# Patient Record
Sex: FEMALE | Race: WHITE
[De-identification: names, ages, dates, MRNs, and addresses within clinical notes are randomized per-mention and may not be internally consistent; named-entity substitution may affect disease eponyms.]

---

## 2018-05-08 ENCOUNTER — HOSPITAL ENCOUNTER (OUTPATIENT)
Dept: HOSPITAL 95 - LAB SHORT | Age: 63
Discharge: HOME | End: 2018-05-08
Attending: DERMATOLOGY
Payer: COMMERCIAL

## 2018-05-08 DIAGNOSIS — D04.61: Primary | ICD-10-CM

## 2019-07-08 ENCOUNTER — HOSPITAL ENCOUNTER (OUTPATIENT)
Dept: HOSPITAL 95 - LAB | Age: 64
Discharge: HOME | End: 2019-07-08
Attending: NURSE PRACTITIONER
Payer: COMMERCIAL

## 2019-07-08 DIAGNOSIS — Z01.419: Primary | ICD-10-CM

## 2019-07-08 PROCEDURE — G0145 SCR C/V CYTO,THINLAYER,RESCR: HCPCS

## 2019-07-10 LAB — OTHER STN SPEC: (no result)

## 2019-07-27 ENCOUNTER — HOSPITAL ENCOUNTER (OUTPATIENT)
Dept: HOSPITAL 95 - LAB SHORT | Age: 64
Discharge: HOME | End: 2019-07-27
Attending: PHYSICIAN ASSISTANT
Payer: COMMERCIAL

## 2019-07-27 DIAGNOSIS — N39.0: Primary | ICD-10-CM

## 2020-10-28 ENCOUNTER — HOSPITAL ENCOUNTER (OUTPATIENT)
Dept: HOSPITAL 95 - LAB EV | Age: 65
Discharge: HOME | End: 2020-10-28
Attending: NURSE PRACTITIONER
Payer: COMMERCIAL

## 2020-10-28 DIAGNOSIS — Z20.828: Primary | ICD-10-CM

## 2020-10-28 PROCEDURE — U0003 INFECTIOUS AGENT DETECTION BY NUCLEIC ACID (DNA OR RNA); SEVERE ACUTE RESPIRATORY SYNDROME CORONAVIRUS 2 (SARS-COV-2) (CORONAVIRUS DISEASE [COVID-19]), AMPLIFIED PROBE TECHNIQUE, MAKING USE OF HIGH THROUGHPUT TECHNOLOGIES AS DESCRIBED BY CMS-2020-01-R: HCPCS

## 2020-11-13 ENCOUNTER — HOSPITAL ENCOUNTER (OUTPATIENT)
Dept: HOSPITAL 95 - LAB SHORT | Age: 65
Discharge: HOME | End: 2020-11-13
Attending: FAMILY MEDICINE
Payer: COMMERCIAL

## 2020-11-13 DIAGNOSIS — Z20.828: Primary | ICD-10-CM

## 2020-11-13 PROCEDURE — U0003 INFECTIOUS AGENT DETECTION BY NUCLEIC ACID (DNA OR RNA); SEVERE ACUTE RESPIRATORY SYNDROME CORONAVIRUS 2 (SARS-COV-2) (CORONAVIRUS DISEASE [COVID-19]), AMPLIFIED PROBE TECHNIQUE, MAKING USE OF HIGH THROUGHPUT TECHNOLOGIES AS DESCRIBED BY CMS-2020-01-R: HCPCS

## 2021-07-07 ENCOUNTER — HOSPITAL ENCOUNTER (OUTPATIENT)
Dept: HOSPITAL 95 - LAB SHORT | Age: 66
Discharge: HOME | End: 2021-07-07
Attending: DERMATOLOGY
Payer: COMMERCIAL

## 2021-07-07 DIAGNOSIS — D22.0: Primary | ICD-10-CM

## 2022-03-03 ENCOUNTER — HOSPITAL ENCOUNTER (OUTPATIENT)
Dept: HOSPITAL 95 - ORSCMMR | Age: 67
Discharge: HOME | End: 2022-03-03
Attending: STUDENT IN AN ORGANIZED HEALTH CARE EDUCATION/TRAINING PROGRAM
Payer: COMMERCIAL

## 2022-03-03 VITALS — BODY MASS INDEX: 23.35 KG/M2 | HEIGHT: 66 IN | WEIGHT: 145.28 LBS

## 2022-03-03 DIAGNOSIS — K63.5: ICD-10-CM

## 2022-03-03 DIAGNOSIS — K21.9: ICD-10-CM

## 2022-03-03 DIAGNOSIS — Z79.82: ICD-10-CM

## 2022-03-03 DIAGNOSIS — L53.8: ICD-10-CM

## 2022-03-03 DIAGNOSIS — Z12.11: Primary | ICD-10-CM

## 2022-03-03 DIAGNOSIS — E78.5: ICD-10-CM

## 2022-03-03 DIAGNOSIS — F17.210: ICD-10-CM

## 2022-03-03 DIAGNOSIS — D12.2: ICD-10-CM

## 2022-03-03 DIAGNOSIS — Z79.899: ICD-10-CM

## 2022-03-03 DIAGNOSIS — Z86.010: ICD-10-CM

## 2022-03-03 PROCEDURE — 0DBB8ZX EXCISION OF ILEUM, VIA NATURAL OR ARTIFICIAL OPENING ENDOSCOPIC, DIAGNOSTIC: ICD-10-PCS | Performed by: STUDENT IN AN ORGANIZED HEALTH CARE EDUCATION/TRAINING PROGRAM

## 2022-03-03 PROCEDURE — 0DBM8ZX EXCISION OF DESCENDING COLON, VIA NATURAL OR ARTIFICIAL OPENING ENDOSCOPIC, DIAGNOSTIC: ICD-10-PCS | Performed by: STUDENT IN AN ORGANIZED HEALTH CARE EDUCATION/TRAINING PROGRAM

## 2022-03-03 PROCEDURE — 0DBK8ZX EXCISION OF ASCENDING COLON, VIA NATURAL OR ARTIFICIAL OPENING ENDOSCOPIC, DIAGNOSTIC: ICD-10-PCS | Performed by: STUDENT IN AN ORGANIZED HEALTH CARE EDUCATION/TRAINING PROGRAM

## 2022-03-03 NOTE — NUR
03/03/22 JASEN LOPEZ
History, Chart, Medications and Allergies reviewed before start of
procedure. Patient confirms NPO status and agrees with scheduled
surgery. 3-LEAD EKG REVIEWED WITH PHYSICIAN PRIOR TO START OF
PROCEDURE. MONITOR INTACT WITH CONTINUOUS PULSE OXIMETRY AND
INTERMITTENT BP. PATIENT DETERMINED TO BE ASA APPROPRIATE FOR
PROPOFOL SEDATION PRIOR TO START OF PROCEDURE BY DR. ALDRICH. O2 VIA
NASAL CANNULA.

## 2022-08-15 ENCOUNTER — HOSPITAL ENCOUNTER (OUTPATIENT)
Dept: HOSPITAL 95 - LAB | Age: 67
Discharge: HOME | End: 2022-08-15
Payer: COMMERCIAL

## 2022-08-15 DIAGNOSIS — Z01.419: Primary | ICD-10-CM

## 2022-08-16 ENCOUNTER — HOSPITAL ENCOUNTER (OUTPATIENT)
Dept: HOSPITAL 95 - PLD | Age: 67
Discharge: HOME | End: 2022-08-16
Attending: DERMATOLOGY
Payer: COMMERCIAL

## 2022-08-16 DIAGNOSIS — L57.0: Primary | ICD-10-CM

## 2023-05-08 ENCOUNTER — HOSPITAL ENCOUNTER (OUTPATIENT)
Dept: HOSPITAL 95 - LAB | Age: 68
Discharge: HOME | End: 2023-05-08
Attending: STUDENT IN AN ORGANIZED HEALTH CARE EDUCATION/TRAINING PROGRAM
Payer: COMMERCIAL

## 2023-05-08 DIAGNOSIS — Z79.899: ICD-10-CM

## 2023-05-08 DIAGNOSIS — Z51.81: Primary | ICD-10-CM

## 2023-05-08 PROCEDURE — G0480 DRUG TEST DEF 1-7 CLASSES: HCPCS

## 2023-10-29 ENCOUNTER — HOSPITAL ENCOUNTER (OUTPATIENT)
Dept: HOSPITAL 95 - LAB SHORT | Age: 68
End: 2023-10-29
Attending: PHYSICIAN ASSISTANT
Payer: COMMERCIAL

## 2023-10-29 DIAGNOSIS — R07.9: Primary | ICD-10-CM

## 2023-10-29 LAB
ALBUMIN SERPL BCP-MCNC: 4.1 G/DL (ref 3.4–5)
ALBUMIN/GLOB SERPL: 1.1 {RATIO} (ref 0.8–1.8)
ALT SERPL W P-5'-P-CCNC: 24 U/L (ref 12–78)
ANION GAP SERPL CALCULATED.4IONS-SCNC: 5 MMOL/L (ref 6–16)
AST SERPL W P-5'-P-CCNC: 17 U/L (ref 12–37)
BASOPHILS # BLD AUTO: 0.03 K/MM3 (ref 0–0.23)
BASOPHILS NFR BLD AUTO: 0 % (ref 0–2)
BILIRUB SERPL-MCNC: 0.5 MG/DL (ref 0.1–1)
BUN SERPL-MCNC: 22 MG/DL (ref 8–24)
CALCIUM SERPL-MCNC: 9 MG/DL (ref 8.5–10.1)
CHLORIDE SERPL-SCNC: 110 MMOL/L (ref 98–108)
CO2 SERPL-SCNC: 26 MMOL/L (ref 21–32)
CREAT SERPL-MCNC: 0.71 MG/DL (ref 0.4–1)
DEPRECATED RDW RBC AUTO: 45.3 FL (ref 35.1–46.3)
EOSINOPHIL # BLD AUTO: 0.03 K/MM3 (ref 0–0.68)
EOSINOPHIL NFR BLD AUTO: 0 % (ref 0–6)
ERYTHROCYTE [DISTWIDTH] IN BLOOD BY AUTOMATED COUNT: 13.4 % (ref 11.7–14.2)
GLOBULIN SER CALC-MCNC: 3.6 G/DL (ref 2.2–4)
GLUCOSE SERPL-MCNC: 147 MG/DL (ref 70–99)
HCT VFR BLD AUTO: 44.2 % (ref 33–51)
HGB BLD-MCNC: 14.7 G/DL (ref 11.5–16)
IMM GRANULOCYTES # BLD AUTO: 0.02 K/MM3 (ref 0–0.1)
IMM GRANULOCYTES NFR BLD AUTO: 0 % (ref 0–1)
LYMPHOCYTES # BLD AUTO: 2.22 K/MM3 (ref 0.84–5.2)
LYMPHOCYTES NFR BLD AUTO: 32 % (ref 21–46)
MCHC RBC AUTO-ENTMCNC: 33.3 G/DL (ref 31.5–36.5)
MCV RBC AUTO: 93 FL (ref 80–100)
MONOCYTES # BLD AUTO: 0.57 K/MM3 (ref 0.16–1.47)
MONOCYTES NFR BLD AUTO: 8 % (ref 4–13)
NEUTROPHILS # BLD AUTO: 3.98 K/MM3 (ref 1.96–9.15)
NEUTROPHILS NFR BLD AUTO: 58 % (ref 41–73)
NRBC # BLD AUTO: 0 K/MM3 (ref 0–0.02)
NRBC BLD AUTO-RTO: 0 /100 WBC (ref 0–0.2)
PLATELET # BLD AUTO: 192 K/MM3 (ref 150–400)
POTASSIUM SERPL-SCNC: 3.8 MMOL/L (ref 3.5–5.5)
PROT SERPL-MCNC: 7.7 G/DL (ref 6.4–8.2)
SODIUM SERPL-SCNC: 141 MMOL/L (ref 136–145)